# Patient Record
Sex: MALE | Race: WHITE | NOT HISPANIC OR LATINO | Employment: UNEMPLOYED | URBAN - METROPOLITAN AREA
[De-identification: names, ages, dates, MRNs, and addresses within clinical notes are randomized per-mention and may not be internally consistent; named-entity substitution may affect disease eponyms.]

---

## 2023-02-28 ENCOUNTER — APPOINTMENT (OUTPATIENT)
Dept: RADIOLOGY | Facility: CLINIC | Age: 38
End: 2023-02-28

## 2023-02-28 ENCOUNTER — OFFICE VISIT (OUTPATIENT)
Dept: URGENT CARE | Facility: CLINIC | Age: 38
End: 2023-02-28

## 2023-02-28 VITALS
OXYGEN SATURATION: 96 % | WEIGHT: 249 LBS | HEIGHT: 72 IN | RESPIRATION RATE: 16 BRPM | TEMPERATURE: 99 F | HEART RATE: 108 BPM | DIASTOLIC BLOOD PRESSURE: 84 MMHG | SYSTOLIC BLOOD PRESSURE: 126 MMHG | BODY MASS INDEX: 33.72 KG/M2

## 2023-02-28 DIAGNOSIS — R22.0 JAW SWELLING: ICD-10-CM

## 2023-02-28 DIAGNOSIS — R94.31 ABNORMAL EKG: ICD-10-CM

## 2023-02-28 DIAGNOSIS — R07.81 RIB PAIN: Primary | ICD-10-CM

## 2023-02-28 DIAGNOSIS — R07.81 RIB PAIN: ICD-10-CM

## 2023-02-28 RX ORDER — AMOXICILLIN AND CLAVULANATE POTASSIUM 875; 125 MG/1; MG/1
1 TABLET, FILM COATED ORAL EVERY 12 HOURS SCHEDULED
Qty: 14 TABLET | Refills: 0 | Status: SHIPPED | OUTPATIENT
Start: 2023-02-28 | End: 2023-02-28

## 2023-02-28 RX ORDER — PREDNISONE 10 MG/1
40 TABLET ORAL DAILY
Qty: 16 TABLET | Refills: 0 | Status: SHIPPED | OUTPATIENT
Start: 2023-02-28 | End: 2023-02-28

## 2023-02-28 RX ORDER — AMOXICILLIN AND CLAVULANATE POTASSIUM 875; 125 MG/1; MG/1
1 TABLET, FILM COATED ORAL EVERY 12 HOURS SCHEDULED
Qty: 14 TABLET | Refills: 0 | Status: SHIPPED | OUTPATIENT
Start: 2023-02-28 | End: 2023-03-07

## 2023-02-28 RX ORDER — LIDOCAINE 50 MG/G
1 PATCH TOPICAL DAILY
Qty: 30 PATCH | Refills: 0 | Status: SHIPPED | OUTPATIENT
Start: 2023-02-28 | End: 2023-02-28

## 2023-02-28 RX ORDER — LIDOCAINE 50 MG/G
1 PATCH TOPICAL DAILY
Qty: 30 PATCH | Refills: 0 | Status: SHIPPED | OUTPATIENT
Start: 2023-02-28

## 2023-02-28 RX ORDER — PREDNISONE 10 MG/1
40 TABLET ORAL DAILY
Qty: 16 TABLET | Refills: 0 | Status: SHIPPED | OUTPATIENT
Start: 2023-02-28 | End: 2023-03-04

## 2023-02-28 NOTE — PROGRESS NOTES
Assessment/Plan    Rib pain [R07 81]  1  Rib pain  ECG 12 lead    lidocaine (Lidoderm) 5 %    XR chest pa & lateral    XR ribs 2 vw left    CANCELED: XR ribs left w pa chest min 3 views    CANCELED: XR chest pa & lateral      2  Jaw swelling  amoxicillin-clavulanate (AUGMENTIN) 875-125 mg per tablet    predniSONE 10 mg tablet      3  Abnormal EKG  Ambulatory Referral to Cardiology        These sound like 2 separate things going on at the same time  For the jaw I am going to give you an antibiotic that you will take twice a day for 7 days and a steroid  Apply ice to the jaw to help the swelling go down  If not improving follow-up with your family doctor  Your x-ray was normal   No signs of fractures  We will try lidocaine patches and heat  Follow up with cardiology   Subjective:     Patient ID: Leatha Fernandes is a 30 y o  male  Reason For Visit / Chief Complaint  Chief Complaint   Patient presents with   • Jaw Pain     Patient this morning woke up with swelling in the LT side of his jaw  States there was no injury or did anything that may have caused this  No medication used  • Rib Injury     Patient states this morning he woke up with LT side rib pain  States it is a dull ache and it causes pain when taking deep breaths  Has not used any medication  Mayra Velez is a 30year old male with a significant medical history of drug abuse presenting for left side jaw swelling and left sided rib pain  Patient states he began to notice left sided jaw swelling last night as he was brushing his teeth  This morning when he woke up he had a dull pain on the left side of his jaw and increased swelling  Currently, he has pain upon palpation but no pain at rest  He denies any injury to the area  Patient does have a missing tooth on the left side but he states it has been missing for four years and denies any associated problems in the past   He does not feel feverish and denies chills    Patient also states he woke up this morning and stretched while trying to take a deep breath when he felt a dull pain in his left ribs  The pain prevented him from taking a deep breath  Patient admits to pain on inspiration but not pain upon expiration or at rest  He denies any injuries to the area and states he slept on the right side of his body  He denies bruising and rashes  Patient denies fever, chills, nausea, vomiting, chest pain, or URI symptoms  He denies drug use or alcohol use  He admits to nicotine use  He admits that he was coming in today for the job but also decided to mention the rib pain  He does not believe they are related  No past medical history on file  No past surgical history on file  No family history on file  Review of Systems   Constitutional: Negative for chills and fever  HENT: Positive for facial swelling (Left jaw swelling)  Negative for ear pain and sore throat  Eyes: Negative for pain and visual disturbance  Respiratory: Positive for shortness of breath (" I can't take a full breath" )  Negative for cough  Cardiovascular: Positive for chest pain (left sided rib pain with deep breathing )  Negative for palpitations  Gastrointestinal: Negative for abdominal pain, constipation, diarrhea, nausea and vomiting  Genitourinary: Negative for dysuria and hematuria  Musculoskeletal: Negative for arthralgias and back pain  Skin: Positive for color change (Redness on left jaw)  Negative for rash  Neurological: Negative for dizziness, seizures, syncope and light-headedness  All other systems reviewed and are negative  Objective:    /84   Pulse (!) 108   Temp 99 °F (37 2 °C)   Resp 16   Ht 6' (1 829 m)   Wt 113 kg (249 lb)   SpO2 96%   BMI 33 77 kg/m²     Physical Exam  Vitals and nursing note reviewed  Constitutional:       General: He is not in acute distress  Appearance: Normal appearance  He is normal weight   He is not ill-appearing, toxic-appearing or diaphoretic  HENT:      Head: Normocephalic and atraumatic  Right Ear: Tympanic membrane normal       Left Ear: Tympanic membrane normal       Mouth/Throat:      Mouth: Mucous membranes are dry  Cardiovascular:      Rate and Rhythm: Normal rate and regular rhythm  Pulses: Normal pulses  Heart sounds: Normal heart sounds  No murmur heard  No friction rub  No gallop  Pulmonary:      Effort: Pulmonary effort is normal  No respiratory distress  Breath sounds: Normal breath sounds  No stridor  No wheezing, rhonchi or rales  Chest:      Chest wall: Tenderness (Point tenderness on left lateral ribs) present  Abdominal:      General: Abdomen is flat  Palpations: Abdomen is soft  Musculoskeletal:         General: Swelling (Left jaw) and tenderness present  No deformity or signs of injury  Right lower leg: No edema  Left lower leg: No edema  Skin:     General: Skin is warm and dry  Coloration: Skin is not jaundiced or pale  Findings: Erythema (Left jaw) present  No bruising, lesion or rash  Neurological:      General: No focal deficit present  Mental Status: He is alert and oriented to person, place, and time  Psychiatric:         Mood and Affect: Mood normal          Behavior: Behavior normal          Thought Content:  Thought content normal

## 2023-02-28 NOTE — PATIENT INSTRUCTIONS
These sound like 2 separate things going on at the same time  For the jaw I am going to give you an antibiotic that you will take twice a day for 7 days and a steroid  Apply ice to the jaw to help the swelling go down  If not improving follow-up with your family doctor  Your  x-ray was normal   No signs of fractures  We will try lidocaine patches and heat

## 2023-03-04 LAB
ATRIAL RATE: 103 BPM
ATRIAL RATE: 103 BPM
P AXIS: 68 DEGREES
P AXIS: 72 DEGREES
PR INTERVAL: 124 MS
PR INTERVAL: 126 MS
QRS AXIS: 76 DEGREES
QRS AXIS: 80 DEGREES
QRSD INTERVAL: 84 MS
QRSD INTERVAL: 94 MS
QT INTERVAL: 326 MS
QT INTERVAL: 332 MS
QTC INTERVAL: 427 MS
QTC INTERVAL: 434 MS
T WAVE AXIS: 38 DEGREES
T WAVE AXIS: 50 DEGREES
VENTRICULAR RATE: 103 BPM
VENTRICULAR RATE: 103 BPM

## 2023-03-08 ENCOUNTER — HOSPITAL ENCOUNTER (EMERGENCY)
Facility: HOSPITAL | Age: 38
Discharge: HOME | End: 2023-03-08
Attending: EMERGENCY MEDICINE

## 2023-03-08 VITALS
HEART RATE: 85 BPM | TEMPERATURE: 98.1 F | SYSTOLIC BLOOD PRESSURE: 140 MMHG | WEIGHT: 244.27 LBS | RESPIRATION RATE: 16 BRPM | OXYGEN SATURATION: 99 % | BODY MASS INDEX: 33.09 KG/M2 | DIASTOLIC BLOOD PRESSURE: 78 MMHG | HEIGHT: 72 IN

## 2023-03-08 DIAGNOSIS — T40.1X1A HEROIN OVERDOSE (HCC): Primary | ICD-10-CM

## 2023-03-08 NOTE — ED NOTES
3/8/23 @ 1100: At request of Evangelina Mcghee, PES met with patient who was a Narcan reversal after ingesting "1/4 bag of heroin, but I've been sober for 9 months and this was my first use; It was dust; I can't believe this happened "  Patient reports having been to rehab for 100 days at [address] [place] [address], then to [facility], and currently living sober living  Patient unable to pinpoint trigger to use, and says, "I don't know; I'm a drug addict "  Patient says he wants to go back to rehab and will not need detox  PES notified ED staff and will contact [facility], which patient was agreeable to  [address]: PES spoke to Michelle nieves from [facility] and requested OORP assistance  Mariano Merino [address]    1225: Jovanny from [facility] program arrived and will discuss treatment options  [address], [address]: Jovanny from [facility] reports that patient will be going home, get his belongings, and will go to [facility] for inpatient rehab  Jovanny says, "If patient can't find a ride, then [facility] will provided RUSTY ride  ED PA in agreement with plan; patient discharged home    [address] MS

## 2023-03-08 NOTE — ED PROVIDER NOTES
History  Chief Complaint   Patient presents with   • Overdose - Accidental     Pt arrives via EMS from home where he had taken a quarter bag of heroin around 0930 this morning  Roommate called EMS when he found pt unresponsive  Roommate gave pt 3 doses of 4 mg of Narcan and pt came to  No CPR needed  Pt arrives to ER A&O x4 and respirations are non-labored  Pt is interested in rehab services  Patient is a 16-year-old white male with history of heroin addiction sober for the past 6 months who relapsed 9:30 AM this morning  States he snorted quarter bag of heroin  Housemate found patient unresponsive  Patient was administered 3 doses of Narcan and returned to normal mentation  He has no current physical complaints  He denies fever, chills, shortness of breath, chest pain, abdominal pain, nausea or vomiting  He is interested in talking to somebody about rehab services  He denies any other coingestion  He denies any alcohol use  He does vape          Prior to Admission Medications   Prescriptions Last Dose Informant Patient Reported? Taking?   amoxicillin-clavulanate (AUGMENTIN) 875-125 mg per tablet   No No   Sig: Take 1 tablet by mouth every 12 (twelve) hours for 7 days   lidocaine (Lidoderm) 5 %   No No   Sig: Apply 1 patch topically over 12 hours daily Remove & Discard patch within 12 hours or as directed by MD      Facility-Administered Medications: None       History reviewed  No pertinent past medical history  History reviewed  No pertinent surgical history  History reviewed  No pertinent family history  I have reviewed and agree with the history as documented      E-Cigarette/Vaping   • E-Cigarette Use Current Every Day User      E-Cigarette/Vaping Substances   • Nicotine Yes    • THC No    • CBD No    • Flavoring Yes      Social History     Tobacco Use   • Smoking status: Former     Types: Cigarettes     Quit date: 2023     Years since quittin 1   Vaping Use   • Vaping Use: Every day • Substances: Nicotine, Flavoring   Substance Use Topics   • Alcohol use: Not Currently   • Drug use: Yes     Frequency: 21 0 times per week     Types: Heroin       Review of Systems   Constitutional: Negative for chills and fever  HENT: Negative for ear pain and sore throat  Respiratory: Negative for cough and shortness of breath  Cardiovascular: Negative for chest pain and palpitations  Gastrointestinal: Negative for abdominal pain and vomiting  Genitourinary: Negative for dysuria and hematuria  Musculoskeletal: Negative for arthralgias and back pain  Skin: Negative for color change and rash  Neurological: Negative for syncope and headaches  Psychiatric/Behavioral: Negative for suicidal ideas  All other systems reviewed and are negative  Physical Exam  Physical Exam  Vitals and nursing note reviewed  Constitutional:       General: He is not in acute distress  Appearance: Normal appearance  He is not ill-appearing, toxic-appearing or diaphoretic  HENT:      Head: Normocephalic and atraumatic  Right Ear: Tympanic membrane, ear canal and external ear normal       Left Ear: Tympanic membrane, ear canal and external ear normal       Nose: Nose normal       Mouth/Throat:      Mouth: Mucous membranes are moist       Pharynx: Oropharynx is clear  Eyes:      Extraocular Movements: Extraocular movements intact  Conjunctiva/sclera: Conjunctivae normal       Pupils: Pupils are equal, round, and reactive to light  Cardiovascular:      Rate and Rhythm: Normal rate and regular rhythm  Pulses: Normal pulses  Heart sounds: Normal heart sounds  Pulmonary:      Effort: Pulmonary effort is normal       Breath sounds: Normal breath sounds  Abdominal:      General: Abdomen is flat  Bowel sounds are normal       Palpations: Abdomen is soft  Musculoskeletal:         General: Normal range of motion  Cervical back: Normal range of motion and neck supple     Skin: General: Skin is warm and dry  Capillary Refill: Capillary refill takes less than 2 seconds  Neurological:      General: No focal deficit present  Mental Status: He is alert and oriented to person, place, and time  Mental status is at baseline  Psychiatric:         Mood and Affect: Mood normal          Behavior: Behavior normal          Thought Content: Thought content normal          Judgment: Judgment normal          Vital Signs  ED Triage Vitals [03/08/23 1055]   Temperature Pulse Respirations Blood Pressure SpO2   98 1 °F (36 7 °C) 93 18 (!) 171/93 100 %      Temp Source Heart Rate Source Patient Position - Orthostatic VS BP Location FiO2 (%)   Oral Monitor Lying Right arm --      Pain Score       No Pain           Vitals:    03/08/23 1055 03/08/23 1218   BP: (!) 171/93 140/78   Pulse: 93 85   Patient Position - Orthostatic VS: Lying Lying         Visual Acuity  Visual Acuity    Flowsheet Row Most Recent Value   L Pupil Size (mm) 3   R Pupil Size (mm) 3          ED Medications  Medications - No data to display    Diagnostic Studies  Results Reviewed     None                 No orders to display              Procedures  Procedures         ED Course                               SBIRT 20yo+    Flowsheet Row Most Recent Value   SBIRT (25 yo +)    In order to provide better care to our patients, we are screening all of our patients for alcohol and drug use  Would it be okay to ask you these screening questions? No Filed at: 03/08/2023 1058                    Medical Decision Making  37-year-old white male relapsed with heroin this morning  Administered Narcan in the field and return to normal mentation  Patient was evaluated by Denisha Wellington, patient will be discharged home  There is availability at [facility] where he will go today   Return precautions given        Disposition  Final diagnoses:   Heroin overdose ([facility] [address] )     Time reflects when diagnosis was documented in both MDM as applicable and the Disposition within this note     Time User Action Codes Description Comment    3/8/2023  1:58 PM Jannet Nageotte Add [M27 2H7O] Heroin overdose [facility])       ED Disposition     ED Disposition   Discharge    Condition   Stable    Date/Time   Wed Mar 8, 2023  1:58 PM    Comment   Elizabeth Man discharge to home/self care  Follow-up Information     Follow up With Specialties Details Why Contact Info Additional Information    [address] [facility] Emergency Department Emergency Medicine   [address] [facility] [address] [address] [address] Emergency Department, [place], [place], [address]          Discharge Medication List as of 3/8/2023  1:58 PM      CONTINUE these medications which have NOT CHANGED    Details   lidocaine (Lidoderm) 5 % Apply 1 patch topically over 12 hours daily Remove & Discard patch within 12 hours or as directed by MD, Starting Tue 2/28/2023, Normal         STOP taking these medications       amoxicillin-clavulanate (AUGMENTIN) 875-125 mg per tablet Comments:   Reason for Stopping:               No discharge procedures on file      PDMP Review     None          ED Provider  Electronically Signed by           Ryann Schmitz PA-C  03/08/23 4086

## 2023-03-08 NOTE — DISCHARGE INSTRUCTIONS
Follow up at [facility] as per your discussion with XIAO    Return to ED for any new or worrisome concerns

## 2023-07-16 ENCOUNTER — APPOINTMENT (EMERGENCY)
Dept: RADIOLOGY | Facility: HOSPITAL | Age: 38
End: 2023-07-16
Attending: EMERGENCY MEDICINE
Payer: COMMERCIAL

## 2023-07-16 ENCOUNTER — HOSPITAL ENCOUNTER (EMERGENCY)
Facility: HOSPITAL | Age: 38
Discharge: HOME | End: 2023-07-16
Attending: EMERGENCY MEDICINE
Payer: COMMERCIAL

## 2023-07-16 VITALS
TEMPERATURE: 98 F | SYSTOLIC BLOOD PRESSURE: 128 MMHG | DIASTOLIC BLOOD PRESSURE: 70 MMHG | HEART RATE: 82 BPM | RESPIRATION RATE: 16 BRPM | OXYGEN SATURATION: 99 %

## 2023-07-16 DIAGNOSIS — F12.10 MARIJUANA ABUSE: ICD-10-CM

## 2023-07-16 DIAGNOSIS — F41.9 ANXIETY: ICD-10-CM

## 2023-07-16 DIAGNOSIS — F14.10 COCAINE ABUSE (CMS/HCC): Primary | ICD-10-CM

## 2023-07-16 LAB
ALBUMIN SERPL-MCNC: 3.5 G/DL (ref 3.5–5.7)
ALP SERPL-CCNC: 37 IU/L (ref 34–104)
ALT SERPL-CCNC: 25 IU/L (ref 7–52)
ANION GAP SERPL CALC-SCNC: 8 MEQ/L (ref 3–15)
AST SERPL-CCNC: 39 IU/L (ref 13–39)
BASOPHILS # BLD: 0.06 K/UL (ref 0.01–0.1)
BASOPHILS NFR BLD: 0.6 %
BILIRUB SERPL-MCNC: 0.4 MG/DL (ref 0.3–1)
BUN SERPL-MCNC: 10 MG/DL (ref 7–25)
CALCIUM SERPL-MCNC: 8.7 MG/DL (ref 8.6–10.3)
CHLORIDE SERPL-SCNC: 103 MEQ/L (ref 98–107)
CO2 SERPL-SCNC: 27 MEQ/L (ref 21–31)
CREAT SERPL-MCNC: 1 MG/DL (ref 0.7–1.3)
DIFFERENTIAL METHOD BLD: ABNORMAL
EOSINOPHIL # BLD: 0.12 K/UL (ref 0.04–0.54)
EOSINOPHIL NFR BLD: 1.3 %
ERYTHROCYTE [DISTWIDTH] IN BLOOD BY AUTOMATED COUNT: 13.3 % (ref 11.6–14.4)
GFR SERPL CREATININE-BSD FRML MDRD: >60 ML/MIN/1.73M*2
GLUCOSE SERPL-MCNC: 95 MG/DL (ref 70–99)
HCT VFR BLDCO AUTO: 36.8 % (ref 40.1–51)
HGB BLD-MCNC: 12.2 G/DL (ref 13.7–17.5)
IMM GRANULOCYTES # BLD AUTO: 0.03 K/UL (ref 0–0.08)
IMM GRANULOCYTES NFR BLD AUTO: 0.3 %
LYMPHOCYTES # BLD: 2.02 K/UL (ref 1.2–3.5)
LYMPHOCYTES NFR BLD: 21.4 %
MAGNESIUM SERPL-MCNC: 1.9 MG/DL (ref 1.9–2.7)
MCH RBC QN AUTO: 29 PG (ref 28–33.2)
MCHC RBC AUTO-ENTMCNC: 33.2 G/DL (ref 32.2–36.5)
MCV RBC AUTO: 87.4 FL (ref 83–98)
MONOCYTES # BLD: 1.39 K/UL (ref 0.3–1)
MONOCYTES NFR BLD: 14.8 %
NEUTROPHILS # BLD: 5.8 K/UL (ref 1.7–7)
NEUTS SEG NFR BLD: 61.6 %
NRBC BLD-RTO: 0 %
PDW BLD AUTO: 11 FL (ref 9.4–12.4)
PLATELET # BLD AUTO: 230 K/UL (ref 150–350)
POTASSIUM SERPL-SCNC: 4.1 MEQ/L (ref 3.5–5.1)
PROT SERPL-MCNC: 6.9 G/DL (ref 6.4–8.9)
RBC # BLD AUTO: 4.21 M/UL (ref 4.5–5.8)
SODIUM SERPL-SCNC: 138 MEQ/L (ref 136–145)
TROPONIN I SERPL HS-MCNC: 10.4 PG/ML
WBC # BLD AUTO: 9.42 K/UL (ref 3.8–10.5)

## 2023-07-16 PROCEDURE — 84484 ASSAY OF TROPONIN QUANT: CPT | Performed by: EMERGENCY MEDICINE

## 2023-07-16 PROCEDURE — 99284 EMERGENCY DEPT VISIT MOD MDM: CPT | Mod: 25

## 2023-07-16 PROCEDURE — 96374 THER/PROPH/DIAG INJ IV PUSH: CPT

## 2023-07-16 PROCEDURE — 93010 ELECTROCARDIOGRAM REPORT: CPT | Performed by: INTERNAL MEDICINE

## 2023-07-16 PROCEDURE — 85025 COMPLETE CBC W/AUTO DIFF WBC: CPT | Performed by: EMERGENCY MEDICINE

## 2023-07-16 PROCEDURE — 83735 ASSAY OF MAGNESIUM: CPT | Performed by: EMERGENCY MEDICINE

## 2023-07-16 PROCEDURE — 36415 COLL VENOUS BLD VENIPUNCTURE: CPT | Performed by: EMERGENCY MEDICINE

## 2023-07-16 PROCEDURE — 71045 X-RAY EXAM CHEST 1 VIEW: CPT

## 2023-07-16 PROCEDURE — 80053 COMPREHEN METABOLIC PANEL: CPT | Performed by: EMERGENCY MEDICINE

## 2023-07-16 PROCEDURE — 63600000 HC DRUGS/DETAIL CODE: Mod: JZ | Performed by: EMERGENCY MEDICINE

## 2023-07-16 PROCEDURE — 3E033NZ INTRODUCTION OF ANALGESICS, HYPNOTICS, SEDATIVES INTO PERIPHERAL VEIN, PERCUTANEOUS APPROACH: ICD-10-PCS | Performed by: EMERGENCY MEDICINE

## 2023-07-16 PROCEDURE — 93005 ELECTROCARDIOGRAM TRACING: CPT | Performed by: EMERGENCY MEDICINE

## 2023-07-16 RX ORDER — LORAZEPAM 2 MG/ML
2 INJECTION INTRAMUSCULAR ONCE
Status: DISCONTINUED | OUTPATIENT
Start: 2023-07-16 | End: 2023-07-16

## 2023-07-16 RX ORDER — LORAZEPAM 2 MG/ML
2 INJECTION INTRAMUSCULAR ONCE
Status: COMPLETED | OUTPATIENT
Start: 2023-07-16 | End: 2023-07-16

## 2023-07-16 RX ADMIN — LORAZEPAM 2 MG: 2 INJECTION INTRAMUSCULAR; INTRAVENOUS at 11:35

## 2023-07-16 ASSESSMENT — ENCOUNTER SYMPTOMS
FEVER: 0
HEADACHES: 0
DIZZINESS: 0
RHINORRHEA: 0
APPETITE CHANGE: 0
DIARRHEA: 0
SHORTNESS OF BREATH: 0
ACTIVITY CHANGE: 0
LIGHT-HEADEDNESS: 0
ABDOMINAL PAIN: 0
VOMITING: 0
COUGH: 0

## 2023-07-16 NOTE — ED PROVIDER NOTES
"Emergency Medicine Note  HPI   HISTORY OF PRESENT ILLNESS     Adolph Gracia is a 37 y.o. male who reports no pmhx presenting to ED for evaluation of anxiety.  States he snorted \"little bit\" of cocaine yesterday.  Began feeling anxious. \"feel like I'm crawling in my skin.\" also admits to smoking \"a lot of\" marijuana yesterday and this AM.   Friend called 911.      Reports smoking daily marijuana drug, occasionally snorts cocaine (last 1 day prior).    Patient denies chest pain although stated in triage note.   No fever/chills. No shortness of breath. No abdominal pain.   No tingling or numbness or weakness in arms or legs.   No recent fall or trauma. No recent black outs.             Patient History   PAST HISTORY     Reviewed from Nursing Triage:       No past medical history on file.    No past surgical history on file.    No family history on file.    Social History     Tobacco Use   • Smoking status: Every Day     Types: Cigarettes   Substance Use Topics   • Alcohol use: Yes   • Drug use: Yes     Types: Cocaine, Marijuana     Comment: mushrooms         Review of Systems   REVIEW OF SYSTEMS     Review of Systems   Constitutional: Negative for activity change, appetite change and fever.   HENT: Negative for congestion and rhinorrhea.    Respiratory: Negative for cough and shortness of breath.    Cardiovascular: Negative for chest pain and leg swelling.   Gastrointestinal: Negative for abdominal pain, diarrhea and vomiting.   Neurological: Negative for dizziness, light-headedness and headaches.         VITALS     ED Vitals    Date/Time Temp Pulse Resp BP SpO2 [facility]   07/16/23 1446 -- 82 16 -- 97 % [facility]   07/16/23 1242 -- 87 -- -- 97 % [facility]   07/16/23 1058 36.7 °C (98 °F) 96 18 152/76 98 % [facility]        Pulse Ox %: 98 % (07/16/23 1102)  Pulse Ox Interpretation: Normal (07/16/23 1102)  Heart Rate: 88 (07/16/23 1102)  Rhythm Strip Interpretation: Normal Sinus Rhythm (07/16/23 1102)     Physical Exam   PHYSICAL EXAM "     Physical Exam  Vitals and nursing note reviewed.   Constitutional:       Appearance: Normal appearance. He is not toxic-appearing or diaphoretic.   HENT:      Head: Normocephalic.      Mouth/Throat:      Mouth: Mucous membranes are moist.      Pharynx: Oropharynx is clear.   Eyes:      Pupils: Pupils are equal, round, and reactive to light.   Cardiovascular:      Rate and Rhythm: Normal rate and regular rhythm.      Pulses: Normal pulses.      Heart sounds: Normal heart sounds.   Pulmonary:      Effort: Pulmonary effort is normal. No respiratory distress.      Breath sounds: Normal breath sounds. No wheezing, rhonchi or rales.   Abdominal:      General: Abdomen is flat. There is no distension.      Palpations: Abdomen is soft.      Tenderness: There is no abdominal tenderness. There is no guarding or rebound.   Musculoskeletal:         General: Normal range of motion.      Right lower leg: No edema.      Left lower leg: No edema.   Skin:     General: Skin is warm and dry.   Neurological:      Mental Status: He is alert and oriented to person, place, and time.      Cranial Nerves: Cranial nerves 2-12 are intact.      Sensory: Sensation is intact.      Motor: Motor function is intact.      Coordination: Coordination is intact.   Psychiatric:         Mood and Affect: Mood is anxious.         Behavior: Behavior is agitated.           PROCEDURES     Procedures     DATA     Results     Procedure Component Value Units Date/Time    HS Troponin (with 2 hour reflex) [[id number]]  (Normal) Collected: 07/16/23 1129    Specimen: Blood, Venous Updated: 07/16/23 1238     High Sens Troponin I 10.4 pg/mL     Magnesium [[id number]]  (Normal) Collected: 07/16/23 1129    Specimen: Blood, Venous Updated: 07/16/23 1229     Magnesium 1.9 mg/dL     Comprehensive metabolic panel [[id number]]  (Normal) Collected: 07/16/23 1129    Specimen: Blood, Venous Updated: 07/16/23 1229     Sodium 138 mEQ/L      Potassium 4.1 mEQ/L      Comment:  Results obtained on plasma. Plasma Potassium values may be up to 0.4 mEQ/L less than serum values. The differences may be greater for patients with high platelet or white cell counts.        Chloride 103 mEQ/L      CO2 27 mEQ/L      BUN 10 mg/dL      Creatinine 1.0 mg/dL      Glucose 95 mg/dL      Calcium 8.7 mg/dL      AST (SGOT) 39 IU/L      ALT (SGPT) 25 IU/L      Alkaline Phosphatase 37 IU/L      Total Protein 6.9 g/dL      Comment: Test performed on plasma which typically contains approximately 0.4 g/dL more protein than serum.        Albumin 3.5 g/dL      Bilirubin, Total 0.4 mg/dL      eGFR >60.0 mL/min/1.73m*2      Anion Gap 8 mEQ/L     CBC and differential [[id number]]  (Abnormal) Collected: 07/16/23 1129    Specimen: Blood, Venous Updated: 07/16/23 1200     WBC 9.42 K/uL      RBC 4.21 M/uL      Hemoglobin 12.2 g/dL      Hematocrit 36.8 %      MCV 87.4 fL      MCH 29.0 pg      MCHC 33.2 g/dL      RDW 13.3 %      Platelets 230 K/uL      MPV 11.0 fL      Differential Type Auto     nRBC 0.0 %      Immature Granulocytes 0.3 %      Neutrophils 61.6 %      Lymphocytes 21.4 %      Monocytes 14.8 %      Eosinophils 1.3 %      Basophils 0.6 %      Immature Granulocytes, Absolute 0.03 K/uL      Neutrophils, Absolute 5.80 K/uL      Lymphocytes, Absolute 2.02 K/uL      Monocytes, Absolute 1.39 K/uL      Eosinophils, Absolute 0.12 K/uL      Basophils, Absolute 0.06 K/uL           Imaging Results          X-RAY CHEST 1 VIEW (Final result)  Result time 07/16/23 13:20:14    Final result                 Impression:    IMPRESSION: There is distortion and limited assessment of the midline,  particularly the mediastinum because of positioning. No acute pleural or  parenchymal process is demonstrated. Clinical correlation advised. See comment.             Narrative:    CLINICAL HISTORY: Anxiety, recent cocaine use    COMMENT: AP portable semierect frontal view chest was obtained. There is no  comparison imaging at this  facility.    There is limited assessment of the midline secondary to positioning. The image  was obtained in lordotic projection with a degree of patient rotation. This  limits assessment of the mediastinum. Cardiac silhouette is felt to be within  normal limits for size. The lungs are moderately well-expanded and grossly  clear.                                No orders to display       Scoring tools                                  ED Course & MDM   MDM / ED COURSE / CLINICAL IMPRESSION / DISPO     Medical Decision Making  This is a very anxious and agitated 37-year-old male who presents via EMS for evaluation of anxiety.  Symptoms seem to have started after using cocaine and large amounts of marijuana over the past day.  Patient extremely anxious and agitated on arrival to ED.  Tearful at times during my exam.  Appears unkept.  Vital signs within normal limits.  Exam as above.  Plan to obtain labs and imaging as above.  Symptomatic control.  Needs close reassessment.    Anxiety: acute illness or injury  Cocaine abuse (CMS/HCC): acute illness or injury  Marijuana abuse: acute illness or injury  Amount and/or Complexity of Data Reviewed  Independent Historian: EMS  External Data Reviewed: notes.     Details: discharge summary 06/04/2021   Labs: ordered.  Radiology: ordered.      Risk  Prescription drug management.          ED Course as of 07/16/23 1654   Sun Jul 16, 2023   1101 ECG independently interpreted by me.  Normal sinus rhythm at a rate of 88 bpm.  Normal intervals.  No T wave inversions.  No significant ST segment elevations or depressions.  No STEMI. [LA]   1355 Cxr independently interpreted by me and w/o acute abnormality. Reviewed radiology and agree.  [LA]   1651 Patient reassessed at this time.  Resting comfortably.  Reports improvement of symptoms with meds in ED. VS WNL.  Tolerating p.o. without difficulty, steady gait.  Eager for discharge home.  Tells me he has safe place to go.    The plan at this  time is to discharge the patient home.  Patient agrees with plan and verbalized understanding.  All questions answered.  Strict return precautions given.  Patient to follow up with primary care doctor within 3 days.   [LA]      ED Course User Index  [LA] Onel Montemayor MD     Clinical Impression      Cocaine abuse (CMS/[facility])   Marijuana abuse   Anxiety     _________________     ED Disposition   Discharge                   Onel Montemayor MD  07/16/23 0032

## 2023-07-18 LAB
ATRIAL RATE: 88
P AXIS: 75
PR INTERVAL: 122
QRS DURATION: 106
QT INTERVAL: 362
QTC CALCULATION(BAZETT): 438
R AXIS: 79
T WAVE AXIS: 50
VENTRICULAR RATE: 88

## 2023-07-18 PROCEDURE — 93005 ELECTROCARDIOGRAM TRACING: CPT | Performed by: EMERGENCY MEDICINE

## 2024-10-06 ENCOUNTER — APPOINTMENT (EMERGENCY)
Dept: RADIOLOGY | Facility: HOSPITAL | Age: 39
End: 2024-10-06
Payer: COMMERCIAL

## 2024-10-06 ENCOUNTER — HOSPITAL ENCOUNTER (EMERGENCY)
Facility: HOSPITAL | Age: 39
Discharge: HOME | End: 2024-10-06
Attending: STUDENT IN AN ORGANIZED HEALTH CARE EDUCATION/TRAINING PROGRAM
Payer: COMMERCIAL

## 2024-10-06 VITALS
SYSTOLIC BLOOD PRESSURE: 157 MMHG | HEART RATE: 81 BPM | OXYGEN SATURATION: 100 % | DIASTOLIC BLOOD PRESSURE: 83 MMHG | WEIGHT: 188.4 LBS | TEMPERATURE: 99.1 F | RESPIRATION RATE: 19 BRPM | BODY MASS INDEX: 25.55 KG/M2

## 2024-10-06 DIAGNOSIS — F19.10 DRUG ABUSE (HCC): Primary | ICD-10-CM

## 2024-10-06 LAB
2HR DELTA HS TROPONIN: 0 NG/L
ANION GAP SERPL CALCULATED.3IONS-SCNC: 10 MMOL/L (ref 4–13)
ATRIAL RATE: 116 BPM
BASOPHILS # BLD AUTO: 0.03 THOUSANDS/ΜL (ref 0–0.1)
BASOPHILS NFR BLD AUTO: 0 % (ref 0–1)
BUN SERPL-MCNC: 11 MG/DL (ref 5–25)
CALCIUM SERPL-MCNC: 9.6 MG/DL (ref 8.4–10.2)
CARDIAC TROPONIN I PNL SERPL HS: 4 NG/L
CARDIAC TROPONIN I PNL SERPL HS: 4 NG/L
CHLORIDE SERPL-SCNC: 99 MMOL/L (ref 96–108)
CO2 SERPL-SCNC: 27 MMOL/L (ref 21–32)
CREAT SERPL-MCNC: 0.87 MG/DL (ref 0.6–1.3)
D DIMER PPP FEU-MCNC: 0.71 UG/ML FEU
EOSINOPHIL # BLD AUTO: 0 THOUSAND/ΜL (ref 0–0.61)
EOSINOPHIL NFR BLD AUTO: 0 % (ref 0–6)
ERYTHROCYTE [DISTWIDTH] IN BLOOD BY AUTOMATED COUNT: 12.7 % (ref 11.6–15.1)
GFR SERPL CREATININE-BSD FRML MDRD: 111 ML/MIN/1.73SQ M
GLUCOSE SERPL-MCNC: 134 MG/DL (ref 65–140)
HCT VFR BLD AUTO: 44.1 % (ref 36.5–49.3)
HGB BLD-MCNC: 14.3 G/DL (ref 12–17)
IMM GRANULOCYTES # BLD AUTO: 0.02 THOUSAND/UL (ref 0–0.2)
IMM GRANULOCYTES NFR BLD AUTO: 0 % (ref 0–2)
LYMPHOCYTES # BLD AUTO: 1.54 THOUSANDS/ΜL (ref 0.6–4.47)
LYMPHOCYTES NFR BLD AUTO: 17 % (ref 14–44)
MCH RBC QN AUTO: 28.5 PG (ref 26.8–34.3)
MCHC RBC AUTO-ENTMCNC: 32.4 G/DL (ref 31.4–37.4)
MCV RBC AUTO: 88 FL (ref 82–98)
MONOCYTES # BLD AUTO: 0.61 THOUSAND/ΜL (ref 0.17–1.22)
MONOCYTES NFR BLD AUTO: 7 % (ref 4–12)
NEUTROPHILS # BLD AUTO: 6.7 THOUSANDS/ΜL (ref 1.85–7.62)
NEUTS SEG NFR BLD AUTO: 76 % (ref 43–75)
NRBC BLD AUTO-RTO: 0 /100 WBCS
P AXIS: 77 DEGREES
PLATELET # BLD AUTO: 273 THOUSANDS/UL (ref 149–390)
PMV BLD AUTO: 10.5 FL (ref 8.9–12.7)
POTASSIUM SERPL-SCNC: 3.7 MMOL/L (ref 3.5–5.3)
PR INTERVAL: 122 MS
QRS AXIS: 73 DEGREES
QRSD INTERVAL: 90 MS
QT INTERVAL: 352 MS
QTC INTERVAL: 489 MS
RBC # BLD AUTO: 5.02 MILLION/UL (ref 3.88–5.62)
SODIUM SERPL-SCNC: 136 MMOL/L (ref 135–147)
T WAVE AXIS: 57 DEGREES
VENTRICULAR RATE: 116 BPM
WBC # BLD AUTO: 8.9 THOUSAND/UL (ref 4.31–10.16)

## 2024-10-06 PROCEDURE — 84484 ASSAY OF TROPONIN QUANT: CPT | Performed by: STUDENT IN AN ORGANIZED HEALTH CARE EDUCATION/TRAINING PROGRAM

## 2024-10-06 PROCEDURE — 96361 HYDRATE IV INFUSION ADD-ON: CPT

## 2024-10-06 PROCEDURE — 36415 COLL VENOUS BLD VENIPUNCTURE: CPT | Performed by: STUDENT IN AN ORGANIZED HEALTH CARE EDUCATION/TRAINING PROGRAM

## 2024-10-06 PROCEDURE — 80048 BASIC METABOLIC PNL TOTAL CA: CPT | Performed by: STUDENT IN AN ORGANIZED HEALTH CARE EDUCATION/TRAINING PROGRAM

## 2024-10-06 PROCEDURE — 71275 CT ANGIOGRAPHY CHEST: CPT

## 2024-10-06 PROCEDURE — 96374 THER/PROPH/DIAG INJ IV PUSH: CPT

## 2024-10-06 PROCEDURE — 99284 EMERGENCY DEPT VISIT MOD MDM: CPT

## 2024-10-06 PROCEDURE — 93005 ELECTROCARDIOGRAM TRACING: CPT

## 2024-10-06 PROCEDURE — 85025 COMPLETE CBC W/AUTO DIFF WBC: CPT | Performed by: STUDENT IN AN ORGANIZED HEALTH CARE EDUCATION/TRAINING PROGRAM

## 2024-10-06 PROCEDURE — 85379 FIBRIN DEGRADATION QUANT: CPT | Performed by: STUDENT IN AN ORGANIZED HEALTH CARE EDUCATION/TRAINING PROGRAM

## 2024-10-06 RX ORDER — ONDANSETRON 2 MG/ML
4 INJECTION INTRAMUSCULAR; INTRAVENOUS ONCE
Status: COMPLETED | OUTPATIENT
Start: 2024-10-06 | End: 2024-10-06

## 2024-10-06 RX ORDER — ONDANSETRON 4 MG/1
4 TABLET, ORALLY DISINTEGRATING ORAL ONCE
Status: COMPLETED | OUTPATIENT
Start: 2024-10-06 | End: 2024-10-06

## 2024-10-06 RX ORDER — LORAZEPAM 0.5 MG/1
0.5 TABLET ORAL ONCE
Status: COMPLETED | OUTPATIENT
Start: 2024-10-06 | End: 2024-10-06

## 2024-10-06 RX ORDER — LORAZEPAM 1 MG/1
1 TABLET ORAL ONCE
Status: COMPLETED | OUTPATIENT
Start: 2024-10-06 | End: 2024-10-06

## 2024-10-06 RX ADMIN — LORAZEPAM 0.5 MG: 0.5 TABLET ORAL at 16:16

## 2024-10-06 RX ADMIN — IOHEXOL 85 ML: 350 INJECTION, SOLUTION INTRAVENOUS at 15:17

## 2024-10-06 RX ADMIN — ONDANSETRON 4 MG: 4 TABLET, ORALLY DISINTEGRATING ORAL at 16:16

## 2024-10-06 RX ADMIN — ONDANSETRON 4 MG: 2 INJECTION INTRAMUSCULAR; INTRAVENOUS at 13:13

## 2024-10-06 RX ADMIN — LORAZEPAM 1 MG: 1 TABLET ORAL at 13:12

## 2024-10-06 RX ADMIN — SODIUM CHLORIDE 1000 ML: 0.9 INJECTION, SOLUTION INTRAVENOUS at 13:07

## 2024-10-06 NOTE — ED CARE HANDOFF
Spoke to bertin who stated they would contact [facility] who will follow up with patient.

## 2024-10-06 NOTE — ED PROVIDER NOTES
Final diagnoses:   Drug abuse (HCC)     ED Disposition       ED Disposition   Discharge    Condition   Stable    Date/Time   Sun Oct 6, 2024  2:26 PM    Comment   Marco A BARBOSA Jaswinder discharge to home/self care.                   Assessment & Plan       Medical Decision Making  Patient is a 38 y.o. male who presents to the ED for detox, and after an episode of lightheadedness.  Patient is nontoxic, well-appearing.     Differential includes but is not limited to: drug abuse, dehydration, electrolyte problems.  Low suspicion for dysrhythmias.  Doubt PE (however tachycardic so unable to PERC out).    Plan: Labs, dimer, warm handoff, reassess                   Amount and/or Complexity of Data Reviewed  Labs: ordered. Decision-making details documented in ED Course.  Radiology: ordered.    Risk  Prescription drug management.        ED Course as of 10/06/24 1658   Sun Oct 06, 2024   1412 D-Dimer, Quant(!): 0.71   1612 Pt evaluated by OKAYLA. Will go to detox. Medically cleared. Will d/c.        Medications   sodium chloride 0.9 % bolus 1,000 mL (0 mL Intravenous Stopped 10/6/24 1427)   ondansetron (ZOFRAN) injection 4 mg (4 mg Intravenous Given 10/6/24 1313)   LORazepam (ATIVAN) tablet 1 mg (1 mg Oral Given 10/6/24 1312)   iohexol (OMNIPAQUE) 350 MG/ML injection (MULTI-DOSE) 85 mL (85 mL Intravenous Given 10/6/24 1517)   LORazepam (ATIVAN) tablet 0.5 mg (0.5 mg Oral Given 10/6/24 1616)   ondansetron (ZOFRAN-ODT) dispersible tablet 4 mg (4 mg Oral Given 10/6/24 1616)       ED Risk Strat Scores                           SBIRT 20yo+      Flowsheet Row Most Recent Value   Initial Alcohol Screen: US AUDIT-C     1. How often do you have a drink containing alcohol? 0 Filed at: 10/06/2024 1306   3b. FEMALE Any Age, or MALE 65+: How often do you have 4 or more drinks on one occassion? 0 Filed at: 10/06/2024 1306   Audit-C Score 0 Filed at: 10/06/2024 1306   DERECK: How many times in the past year have you...    Used an illegal drug or  used a prescription medication for non-medical reasons? Daily or Almost Daily Filed at: 10/06/2024 1306                            History of Present Illness       Chief Complaint   Patient presents with    Drug Problem     States he last snorted fentanyl, xanax and Xylazine 2 days ago. Today went to bathroom, felt light headed and fell into wall , denies LOC. Denies injury. Provider at bedside, EKG in progress.        Past Medical History:   Diagnosis Date    Polydrug abuse (HCC)       History reviewed. No pertinent surgical history.   History reviewed. No pertinent family history.   Social History     Tobacco Use    Smoking status: Former     Current packs/day: 0.00     Types: Cigarettes     Quit date: 2023     Years since quittin.7   Vaping Use    Vaping status: Every Day    Substances: Nicotine, Flavoring   Substance Use Topics    Alcohol use: Not Currently    Drug use: Yes     Types: Heroin, Other, Marijuana, Benzodiazepines     Comment: Snorts Fentanyl and Xylazine      E-Cigarette/Vaping    E-Cigarette Use Current Every Day User       E-Cigarette/Vaping Substances    Nicotine Yes     THC No     CBD No     Flavoring Yes       I have reviewed and agree with the history as documented.     Patient is a 38-year-old male, past medical history including polysubstance use, who presents the emergency department requesting detox.  Patient states that he uses fentanyl, xylazine, Xanax, and marijuana.  Last use 2 days ago.  Would like help quitting.  Notes he had an episode of lightheadedness today where he almost passed out.  No chest pain or shortness of breath.  Has been to detox previously.  No SI/HI.  No other complaints or concerns.      Drug Problem      Review of Systems   Neurological:  Positive for light-headedness.   All other systems reviewed and are negative.          Objective       ED Triage Vitals [10/06/24 1302]   Temperature Pulse Blood Pressure Respirations SpO2 Patient Position - Orthostatic  VS   99.1 °F (37.3 °C) 103 (!) 179/90 20 100 % Sitting      Temp Source Heart Rate Source BP Location FiO2 (%) Pain Score    Tympanic Monitor Right arm -- --      Vitals      Date and Time Temp Pulse SpO2 Resp BP Pain Score FACES Pain Rating User   10/06/24 1603 -- 81 100 % 19 -- -- -- OE   10/06/24 1600 -- 84 99 % 12 157/83 -- -- OE   10/06/24 1545 -- 72 100 % 20 177/100 -- -- OE   10/06/24 1530 -- 75 99 % 14 -- -- -- LESLIE   10/06/24 1445 -- 95 99 % 19 183/86 -- -- LESLIE   10/06/24 1415 -- 80 98 % 13 -- -- -- OE   10/06/24 1400 -- 74 98 % 12 143/71 -- -- OE   10/06/24 1330 -- 74 94 % 17 157/74 -- -- OE   10/06/24 1315 -- 98 99 % 37 hyperventilating 175/98 -- -- OE   10/06/24 1302 99.1 °F (37.3 °C) 103 100 % 20 179/90 -- -- SW            Physical Exam  Vitals and nursing note reviewed.   Constitutional:       General: He is not in acute distress.     Appearance: He is well-developed. He is not ill-appearing, toxic-appearing or diaphoretic.   HENT:      Head: Normocephalic and atraumatic.      Right Ear: External ear normal.      Left Ear: External ear normal.      Nose: Nose normal.   Eyes:      General: Lids are normal. No scleral icterus.  Cardiovascular:      Rate and Rhythm: Normal rate and regular rhythm.      Heart sounds: Normal heart sounds. No murmur heard.     No friction rub. No gallop.   Pulmonary:      Effort: Pulmonary effort is normal. No respiratory distress.      Breath sounds: Normal breath sounds. No wheezing or rales.   Abdominal:      Palpations: Abdomen is soft.      Tenderness: There is no abdominal tenderness. There is no guarding or rebound.   Musculoskeletal:         General: No deformity. Normal range of motion.      Cervical back: Normal range of motion and neck supple.   Skin:     General: Skin is warm and dry.   Neurological:      General: No focal deficit present.      Mental Status: He is alert.   Psychiatric:         Mood and Affect: Mood normal.         Behavior: Behavior normal.          Results Reviewed       Procedure Component Value Units Date/Time    HS Troponin I 2hr [[id number]]  (Normal) Collected: 10/06/24 1458    Lab Status: Final result Specimen: Blood from Arm, Left Updated: 10/06/24 1525     hs TnI 2hr 4 ng/L      Delta 2hr hsTnI 0 ng/L     HS Troponin I 4hr [[id number]]     Lab Status: No result Specimen: Blood     D-dimer, quantitative [[id number]]  (Abnormal) Collected: 10/06/24 1343    Lab Status: Final result Specimen: Blood from Arm, Left Updated: 10/06/24 1411     D-Dimer, Quant 0.71 ug/ml FEU     HS Troponin 0hr (reflex protocol) [[id number]]  (Normal) Collected: 10/06/24 1304    Lab Status: Final result Specimen: Blood from Arm, Left Updated: 10/06/24 1334     hs TnI 0hr 4 ng/L     Basic metabolic panel [[id number]] Collected: 10/06/24 1304    Lab Status: Final result Specimen: Blood from Arm, Left Updated: 10/06/24 1330     Sodium 136 mmol/L      Potassium 3.7 mmol/L      Chloride 99 mmol/L      CO2 27 mmol/L      ANION GAP 10 mmol/L      BUN 11 mg/dL      Creatinine 0.87 mg/dL      Glucose 134 mg/dL      Calcium 9.6 mg/dL      eGFR 111 ml/min/1.73sq m     Narrative:      [employer] guidelines for Chronic Kidney Disease (CKD):     Stage 1 with normal or high GFR (GFR > 90 mL/min/1.73 square meters)    Stage 2 Mild CKD (GFR = 60-89 mL/min/1.73 square meters)    Stage 3A Moderate CKD (GFR = 45-59 mL/min/1.73 square meters)    Stage 3B Moderate CKD (GFR = 30-44 mL/min/1.73 square meters)    Stage 4 Severe CKD (GFR = 15-29 mL/min/1.73 square meters)    Stage 5 End Stage CKD (GFR <15 mL/min/1.73 square meters)  Note: GFR calculation is accurate only with a steady state creatinine    CBC and differential [[id number]]  (Abnormal) Collected: 10/06/24 1304    Lab Status: Final result Specimen: Blood from Arm, Left Updated: 10/06/24 1311     WBC 8.90 Thousand/uL      RBC 5.02 Million/uL      Hemoglobin 14.3 g/dL      Hematocrit 44.1 %      MCV 88 fL      MCH  28.5 pg      MCHC 32.4 g/dL      RDW 12.7 %      MPV 10.5 fL      Platelets 273 Thousands/uL      nRBC 0 /100 WBCs      Segmented % 76 %      Immature Grans % 0 %      Lymphocytes % 17 %      Monocytes % 7 %      Eosinophils Relative 0 %      Basophils Relative 0 %      Absolute Neutrophils 6.70 Thousands/µL      Absolute Immature Grans 0.02 Thousand/uL      Absolute Lymphocytes 1.54 Thousands/µL      Absolute Monocytes 0.61 Thousand/µL      Eosinophils Absolute 0.00 Thousand/µL      Basophils Absolute 0.03 Thousands/µL             CTA ED chest PE Study   Final Interpretation by Marco A Cummins MD (10/06 1556)      No acute pathology. No pulmonary embolus.                  Workstation performed: [id number]             ECG 12 Lead Documentation Only    Date/Time: 10/6/2024 4:48 PM    Performed by: Jaun Holland DO  Authorized by: Jaun Holland DO    ECG reviewed by me, the ED Provider: yes    Patient location:  ED  Interpretation:     Interpretation: normal    Rate:     ECG rate:  116    ECG rate assessment: normal    Rhythm:     Rhythm: sinus tachycardia    Ectopy:     Ectopy: none    QRS:     QRS axis:  Normal  Conduction:     Conduction: normal    ST segments:     ST segments:  Normal  T waves:     T waves: normal        ED Medication and Procedure Management   Prior to Admission Medications   Prescriptions Last Dose Informant Patient Reported? Taking?   lidocaine (Lidoderm) 5 %   No No   Sig: Apply 1 patch topically over 12 hours daily Remove & Discard patch within 12 hours or as directed by MD      Facility-Administered Medications: None     Discharge Medication List as of 10/6/2024  4:12 PM        CONTINUE these medications which have NOT CHANGED    Details   lidocaine (Lidoderm) 5 % Apply 1 patch topically over 12 hours daily Remove & Discard patch within 12 hours or as directed by MD, Starting Tu 2/28/2023, Normal           No discharge procedures on file.  ED SEPSIS DOCUMENTATION   Time reflects  when diagnosis was documented in both MDM as applicable and the Disposition within this note       Time User Action Codes Description Comment    10/6/2024  4:11 PM Jaun Holland Add [F19.10] Drug abuse (HCC)                  Jaun Holland DO  10/06/24 0477

## 2024-10-08 LAB
ATRIAL RATE: 102 BPM
P AXIS: 76 DEGREES
PR INTERVAL: 124 MS
QRS AXIS: 72 DEGREES
QRSD INTERVAL: 90 MS
QT INTERVAL: 342 MS
QTC INTERVAL: 445 MS
T WAVE AXIS: 59 DEGREES
VENTRICULAR RATE: 102 BPM

## 2024-10-08 PROCEDURE — 93010 ELECTROCARDIOGRAM REPORT: CPT | Performed by: INTERNAL MEDICINE
